# Patient Record
Sex: FEMALE | Race: WHITE | NOT HISPANIC OR LATINO | Employment: UNEMPLOYED | RURAL
[De-identification: names, ages, dates, MRNs, and addresses within clinical notes are randomized per-mention and may not be internally consistent; named-entity substitution may affect disease eponyms.]

---

## 2022-05-28 ENCOUNTER — HOSPITAL ENCOUNTER (EMERGENCY)
Facility: HOSPITAL | Age: 37
Discharge: HOME | End: 2022-05-28
Attending: SPECIALIST
Payer: MEDICAID

## 2022-05-28 VITALS
WEIGHT: 264.63 LBS | BODY MASS INDEX: 45.18 KG/M2 | OXYGEN SATURATION: 99 % | DIASTOLIC BLOOD PRESSURE: 81 MMHG | HEIGHT: 64 IN | SYSTOLIC BLOOD PRESSURE: 144 MMHG | RESPIRATION RATE: 20 BRPM | TEMPERATURE: 99 F | HEART RATE: 80 BPM

## 2022-05-28 DIAGNOSIS — I80.8 PHLEBITIS OF SUPERFICIAL VEIN OF UPPER EXTREMITY: Primary | ICD-10-CM

## 2022-05-28 PROCEDURE — 99282 EMERGENCY DEPT VISIT SF MDM: CPT | Mod: ,,, | Performed by: SPECIALIST

## 2022-05-28 PROCEDURE — 96372 THER/PROPH/DIAG INJ SC/IM: CPT

## 2022-05-28 PROCEDURE — 63600175 PHARM REV CODE 636 W HCPCS: Performed by: SPECIALIST

## 2022-05-28 PROCEDURE — 99282 PR EMERGENCY DEPT VISIT,LEVEL II: ICD-10-PCS | Mod: ,,, | Performed by: SPECIALIST

## 2022-05-28 PROCEDURE — 99284 EMERGENCY DEPT VISIT MOD MDM: CPT

## 2022-05-28 RX ORDER — MONTELUKAST SODIUM 4 MG/1
1 TABLET, CHEWABLE ORAL DAILY
COMMUNITY
Start: 2022-03-07

## 2022-05-28 RX ORDER — IBUPROFEN 800 MG/1
800 TABLET ORAL EVERY 6 HOURS PRN
Qty: 40 TABLET | Refills: 0 | Status: SHIPPED | OUTPATIENT
Start: 2022-05-28

## 2022-05-28 RX ORDER — FUROSEMIDE 20 MG/1
20 TABLET ORAL DAILY
COMMUNITY

## 2022-05-28 RX ORDER — PANTOPRAZOLE SODIUM 40 MG/1
40 TABLET, DELAYED RELEASE ORAL 2 TIMES DAILY
COMMUNITY
Start: 2022-03-07

## 2022-05-28 RX ORDER — SUCRALFATE 1 G/1
1 TABLET ORAL 4 TIMES DAILY
COMMUNITY
Start: 2022-03-07

## 2022-05-28 RX ORDER — KETOROLAC TROMETHAMINE 30 MG/ML
60 INJECTION, SOLUTION INTRAMUSCULAR; INTRAVENOUS
Status: COMPLETED | OUTPATIENT
Start: 2022-05-28 | End: 2022-05-28

## 2022-05-28 RX ADMIN — KETOROLAC TROMETHAMINE 60 MG: 30 INJECTION, SOLUTION INTRAMUSCULAR at 09:05

## 2022-05-28 NOTE — ED TRIAGE NOTES
Left arm pain that started 3 days. Pt had an iron infusion in the same arm a week ago. Denies any injury. Strong radial pulse. No edema.

## 2022-05-29 ENCOUNTER — TELEPHONE (OUTPATIENT)
Dept: EMERGENCY MEDICINE | Facility: HOSPITAL | Age: 37
End: 2022-05-29
Payer: MEDICAID

## 2022-05-29 NOTE — ED PROVIDER NOTES
Encounter Date: 2022       History     Chief Complaint   Patient presents with    Arm Injury     Left arm hurting after iron infusion a week ago     Patient is a 36 yo wf who had a iron infusion last week and now is hurting in the left lateral upper elbow prox radius area. The superficial vein is firm and tender with warmth.        Review of patient's allergies indicates:   Allergen Reactions    Codeine      headaches     Past Medical History:   Diagnosis Date    Asthma     Gastric antral vascular ectasia (watermelon stomach)     GERD (gastroesophageal reflux disease)      Past Surgical History:   Procedure Laterality Date     SECTION      CHOLECYSTECTOMY      UPPER GASTROINTESTINAL ENDOSCOPY       History reviewed. No pertinent family history.  Social History     Tobacco Use    Smoking status: Never Smoker    Smokeless tobacco: Never Used   Substance Use Topics    Alcohol use: Yes    Drug use: Never     Review of Systems   Musculoskeletal:        Left proximal forearm and elbow pain    All other systems reviewed and are negative.      Physical Exam     Initial Vitals [22 1839]   BP Pulse Resp Temp SpO2   (!) 144/81 80 20 99.4 °F (37.4 °C) 99 %      MAP       --         Physical Exam    Nursing note and vitals reviewed.  Constitutional: She appears well-developed and well-nourished. No distress.   Eyes: EOM are normal. Pupils are equal, round, and reactive to light.   Neck:   Normal range of motion.  Musculoskeletal:         General: Tenderness present. Normal range of motion.      Cervical back: Normal range of motion.      Comments: Incpain with ROM in all fields.  Superficial vein with firm and tenderness about 4 cm in length     Neurological: She is alert.   Skin: Skin is warm.   Psychiatric: She has a normal mood and affect. Her behavior is normal.         Medical Screening Exam   See Full Note    ED Course   Procedures  Labs Reviewed - No data to display       Imaging Results     None          Medications - No data to display                    Clinical Impression:   Final diagnoses:  [I80.8] Phlebitis of superficial vein of upper extremity (Primary)                 Sherrie Trejo MD  05/28/22 [id number]